# Patient Record
Sex: FEMALE | Race: WHITE
[De-identification: names, ages, dates, MRNs, and addresses within clinical notes are randomized per-mention and may not be internally consistent; named-entity substitution may affect disease eponyms.]

---

## 2021-02-11 ENCOUNTER — HOSPITAL ENCOUNTER (OUTPATIENT)
Dept: HOSPITAL 11 - JP.SDS | Age: 66
Discharge: HOME | End: 2021-02-11
Attending: SURGERY
Payer: MEDICARE

## 2021-02-11 DIAGNOSIS — Z88.8: ICD-10-CM

## 2021-02-11 DIAGNOSIS — K91.89: ICD-10-CM

## 2021-02-11 DIAGNOSIS — Z91.048: ICD-10-CM

## 2021-02-11 DIAGNOSIS — Z88.5: ICD-10-CM

## 2021-02-11 DIAGNOSIS — K91.850: Primary | ICD-10-CM

## 2021-02-11 DIAGNOSIS — K31.89: ICD-10-CM

## 2021-02-11 DIAGNOSIS — Z79.899: ICD-10-CM

## 2021-02-11 DIAGNOSIS — J44.9: ICD-10-CM

## 2021-02-11 DIAGNOSIS — I10: ICD-10-CM

## 2021-02-11 DIAGNOSIS — K21.9: ICD-10-CM

## 2021-02-11 PROCEDURE — 87081 CULTURE SCREEN ONLY: CPT

## 2021-02-11 PROCEDURE — 44386 ENDOSCOPY BOWEL POUCH/BIOP: CPT

## 2021-02-19 NOTE — OR
DATE OF PROCEDURE:  02/11/2021

 

SURGEON:  Hector Gary MD

 

PREOPERATIVE DIAGNOSIS:  History of heartburn, status post Ivan-en-Y gastric bypass.

 

POSTOPERATIVE DIAGNOSES:

1. History of heartburn, status post Ivan-en-Y gastric bypass with minimal inflammation

    within the gastric pouch or esophagogastric junction.

2. 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

 

OPERATIVE PROCEDURE:  Upper GI endoscopy with biopsy of gastric pouch for CLOtest.

 

ANESTHESIA:  IV sedation.

 

INDICATIONS FOR PROCEDURE:  This is a 65-year-old female, presenting status post previous

Ivan-en-Y gastric bypass with some ongoing symptoms suggestive of heartburn.  She presently

is on Protonix 40 mg b.i.d. and also Carafate 1 g q.i.d.  __________ resolving.  The plan is

to proceed with upper GI endoscopy with biopsies and/or dilation as indicated.  Potential

risks of the procedure including bleeding and perforation were discussed, and the patient

wishes to proceed.

 

DETAILS OF PROCEDURE:  The patient was taken to the operating room and placed in a left

lateral decubitus position.  IV sedation was administered, after which the upper GI

endoscope was passed orally through the length of the esophagus through the esophagogastric

junction and gastric pouch and gastrojejunostomy roughly 20 cm into the Ivan limb.

 

Overall, there was no significant inflammation of the esophagus, esophagogastric junction,

or gastrojejunostomy.  The one notable finding was that of some retained solid food present

just distal to the gastrojejunostomy and into the blind end of the Ivan limb which has

become somewhat elongated.  Apart from that, no additional abnormalities were noted.  At

this point, biopsies were obtained from the gastric pouch and sent for CLOtest for H pylori.

Minimal bleeding from the biopsy site was seen and the procedure was then concluded.

 

The patient will be instructed to dissolve the Carafate in liquid prior to taking that to

see if this is helpful with regard to the reflux symptoms.  It is possible that her reflux

symptoms are still related to some acid reduction within the gastric pouch which with the

Protonix and Carafate should be adequately controlled.  If that fails to control, one might

consider laparoscopically trimming down the blind end of the Ivan limb as this may be a

potential source of the patient's symptoms in terms of retained food regurgitating up into

the esophagus in the postprandial period.  She will be following up with Talia Hill at

[facility] in roughly 1 month.

 

 

 

Hector Gary MD

DD:  02/18/2021 18:25:13

DT:  02/19/2021 08:27:58

Job #:  [id number]